# Patient Record
Sex: MALE | Race: WHITE | NOT HISPANIC OR LATINO | Employment: UNEMPLOYED | URBAN - METROPOLITAN AREA
[De-identification: names, ages, dates, MRNs, and addresses within clinical notes are randomized per-mention and may not be internally consistent; named-entity substitution may affect disease eponyms.]

---

## 2017-11-11 ENCOUNTER — COMMUNICATION - HEALTHEAST (OUTPATIENT)
Dept: SCHEDULING | Facility: CLINIC | Age: 47
End: 2017-11-11

## 2018-05-01 ENCOUNTER — OFFICE VISIT - HEALTHEAST (OUTPATIENT)
Dept: ADDICTION MEDICINE | Facility: CLINIC | Age: 48
End: 2018-05-01

## 2018-05-01 DIAGNOSIS — F15.20 METHAMPHETAMINE USE DISORDER, SEVERE (H): ICD-10-CM

## 2018-05-02 ENCOUNTER — OFFICE VISIT - HEALTHEAST (OUTPATIENT)
Dept: ADDICTION MEDICINE | Facility: CLINIC | Age: 48
End: 2018-05-02

## 2018-05-02 DIAGNOSIS — F15.20 METHAMPHETAMINE USE DISORDER, SEVERE (H): ICD-10-CM

## 2018-05-03 ENCOUNTER — OFFICE VISIT - HEALTHEAST (OUTPATIENT)
Dept: ADDICTION MEDICINE | Facility: CLINIC | Age: 48
End: 2018-05-03

## 2018-05-03 ENCOUNTER — COMMUNICATION - HEALTHEAST (OUTPATIENT)
Dept: ADDICTION MEDICINE | Facility: CLINIC | Age: 48
End: 2018-05-03

## 2018-05-03 DIAGNOSIS — F15.20 METHAMPHETAMINE USE DISORDER, SEVERE (H): ICD-10-CM

## 2018-05-08 ENCOUNTER — OFFICE VISIT - HEALTHEAST (OUTPATIENT)
Dept: ADDICTION MEDICINE | Facility: CLINIC | Age: 48
End: 2018-05-08

## 2018-05-08 DIAGNOSIS — F15.20 METHAMPHETAMINE USE DISORDER, SEVERE (H): ICD-10-CM

## 2018-05-09 ENCOUNTER — AMBULATORY - HEALTHEAST (OUTPATIENT)
Dept: ADDICTION MEDICINE | Facility: CLINIC | Age: 48
End: 2018-05-09

## 2018-05-09 ENCOUNTER — OFFICE VISIT - HEALTHEAST (OUTPATIENT)
Dept: ADDICTION MEDICINE | Facility: CLINIC | Age: 48
End: 2018-05-09

## 2018-05-09 DIAGNOSIS — F15.20 METHAMPHETAMINE USE DISORDER, SEVERE (H): ICD-10-CM

## 2018-05-10 ENCOUNTER — OFFICE VISIT - HEALTHEAST (OUTPATIENT)
Dept: ADDICTION MEDICINE | Facility: CLINIC | Age: 48
End: 2018-05-10

## 2018-05-10 DIAGNOSIS — F15.20 METHAMPHETAMINE USE DISORDER, SEVERE (H): ICD-10-CM

## 2018-05-18 ENCOUNTER — AMBULATORY - HEALTHEAST (OUTPATIENT)
Dept: ADDICTION MEDICINE | Facility: CLINIC | Age: 48
End: 2018-05-18

## 2018-05-24 ENCOUNTER — AMBULATORY - HEALTHEAST (OUTPATIENT)
Dept: ADDICTION MEDICINE | Facility: CLINIC | Age: 48
End: 2018-05-24

## 2021-05-24 ENCOUNTER — RECORDS - HEALTHEAST (OUTPATIENT)
Dept: ADMINISTRATIVE | Facility: CLINIC | Age: 51
End: 2021-05-24

## 2021-06-17 NOTE — PROGRESS NOTES
Addiction Services - EOP Treatment Plan     Patient  Name: Padilla Tijerina  MRN: [id number]   : 1970  Admit Date: 2018  Treatment Plan Update: 2018     Vulnerable Adult: No     Patient strengths and needs, identified at intake:   Strengths identified as hard worker, honest, dependable  Needs identified as go along with the crowd and hard time saying no.        Dimension 1: Acute Intoxication/Withdrawal Potential, Risk level: 1   Problem Statement from Comprehensive Assessment:  The patient reports his date of last use of methamphetamine to be on 18. The patient reports no withdrawal concerns and no withdrawal symptoms were observed.  Problem: Continued use  Goal: Patient to maintain abstinence throughout outpatient treatment.   Must be reached to complete treatment? Yes  Methods/Strategies (must include amount and frequency):   1. Patient to report any substance/alcohol use to counselor to determine if any changes need to be made to address withdrawal symptoms.   2. Patient to complete any requested UAs.   Target Date: 2018  Completion Date:     Dimension 2: Biomedical Conditions/Complications, Risk level: 0   Problem Statement from Comprehensive Assessment:  The patient reports he is able to manage and tolerate back pain related to work. The patient reports no primary care provider at this time. The patient does have insurance ([employer] [place]) so he does have access to medical care should he need. At this time there are no emergent biomedical concerns    Problem: Patient does not have a primary care provider   Goal: Patient will identify a primary care provider consistent w/his [employer] coverage and complete an appointment to establish care, as a means of maintaining stable health.  Must be reached to complete treatment? No  Methods/Strategies (must include amount and frequency):   1. Patient to attend all scheduled doctor s appointments.   2. Patient to report any changes to physical health to  "counselor.   3. Patient to take medications as prescribed.   Target Date: 9/1/2018  Completion Date:     Problem: Patient reports current tobacco use.   Goal: Patient to receive information about smoking cessation.   Must be reached to complete treatment? No  Methods/Strategies (must include amount and frequency):   1. Staff to provide patient with nicotine cessation information and help on how to quit use.   2. Patient to report any progress on stopping nicotine use to staff.   Target Date: 9/1/2018  Completion Date:     Dimension 3: Emotional/Behavioral/Cognitive, Risk level: 2   Problem Statement from Comprehensive Assessment:  The patient reports a past diagnoses of depression during his 2nd divorce years ago. The patient reports that he has a diagnostic assessment at [facility] in [place] on 5/10/18. The patient scored high risk on the PANSI however reported no thoughts of harming himself or others. The patient struggles with impulse control as evidence by his continued use despite significant negative consequences.  Problem: Patient reported having DA on [facility] on 5/3/18.  He has not yet begun MH services.  Goal: Patient will address untreated MH symptoms  Must be reached to complete treatment? Yes  Methods/Strategies (must include amount and frequency):   1. Patient will  follow recommendations from his recent DA for treating MH symptoms, and update his CD counselor on his progress  Target Date: 9/1/2018  Completion Date:     Problem: Patient' substance use appears related to impulsivity, as evidenced by repeated use despite having knowledge of the significant consequences he faces regarding child protection and corrections.  Goal: Patient will develop coping skills to minimize impulsive behaviors  Must be reached to complete treatment? Yes  Methods/Strategies (must include amount and frequency):   1. Patient to complete \"Handling Crises\" assignment and share results with your counselor " and/or treatment group.  Target Date: 7/1/2018  Completion Date:       Dimension 4: Readiness to Change, Risk level 1  Problem Statement from Comprehensive Assessment:  The patient verbalizes a need to initiate change. He reports that he is required to be here as it is required by probation. The patient reports that he is willing to follow recommendations made by treatment staff.  Problem: Patient is mandated to complete treatment by probation   Goal: Patient to increase motivation towards recovery by participating in outpatient programming.   Must be reached to complete treatment? Yes  Methods/Strategies (must include amount and frequency):   1. Patient to attend 4, 3-hour groups per week [facility], 6-9:00 PM and all scheduled individual counseling sessions.  2. Patient will contact staff beforehand if unable to attend or will be late: Terri([phone])  Target Date: 9/1/2018  Completion Date:     Problem: Patient appears to lack insight regarding how use has impacted all areas of life - relationships, family life, spirituality, physical health, finances, etc  Goal: Patient will complete Use History assignment  Must be reached to complete treatment? Yes  Methods/Strategies (must include amount and frequency):   1. Patient completed Use History assignment by completing Stages of Change/Use History group activity with peers, and receiving feedback from them, on 5/8/2018  Target Date: 7/1/2018  Completion Date: 5/8/2018    Dimension 5: Relapse/Continued Use/Continued Problem Potential, Risk level: 3   Problem Statement from Comprehensive Assessment:  At this time the patient has minimal coping skills to arrest use long term. The patient has little recognition of relapse and recidivism issues as evidence by his continued use while on probation and involvement of CPS. The patient is at high vulnerability for continued use.  Problem:  Patient has continued to use despite increasingly significant consequences. He appears  "to have minimal awareness of how to respond to triggers and high-risk situations without using.  Goal: Patient will learn to identify triggers and early warning signs of use/relapse to gain awareness of patterns of use.  Must be reached to complete treatment? Yes  Methods/Strategies (must include amount and frequency):   Patient to share in daily check-in any urges and addictive thinking to better understand his pattern of use and to prevent relapse in the future.   Target Date: 9/1/2018  Completion Date:     Problem: Patient has had periods of sobriety that end with resumed use.  Goal: Patient will complete comprehensive Relapse Prevention Plan prior to program completion.  Must be reached to complete treatment? Yes  Methods/Strategies (must include amount and frequency): Patient to complete relapse prevention assignments during treatment, then complete \"Relapse Prevention Planning\" assignment and present to his/her group and/or counselor at time of EOP program completion.  Target Date: 9/1/2018  Completion Date:     Dimension 6: Recovery Environment, Risk level: 3  Problem Statement from Comprehensive Assessment:  The patient is currently on [facility] probation under [place] for a 1st degree possession. The patient had recent CPS involvement as his two children were taken away on 3/30/18. 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 The patient has peer support and support from his fiancé. The patient is not attending any sober support meetings at this time however is interested in attending as he has gone in the past.  Problem: Patient's children are placed in out-of-home care. A TPR trial is scheduled for the patient and his SO on 5/14/18,  Goal: Adhere to CPS mandates  Must be reached to complete treatment? No  Methods/Strategies (must include amount and frequency): Follow parenting plan and all CPS recommendations, attend all scheduled appointments, complete all required " UA's.   Target Date: 9/1/2018  Completion Date:     Problem: Patient is on probation in both [place] and [facility].  Goal: Comply with probation requirements  Must be reached to complete treatment? N/A  Methods/Strategies (must include amount and frequency): Remain in contact with probation officers, attend all scheduled appointments, complete all required UA's.   Target Date: 9/1/2018  Completion Date:     Problem: Patient reports no current participation in meetings or other sober support options.  Goal: Patient will increase sober support options  Must be reached to complete treatment? Yes  Methods/Strategies (must include amount and frequency): Patient to investigate different forms of sober support, e.g. AA, NA CMA, WFS, HR, online support, participate in some form or support 1-2x weekly, and report reactions to counselor and/or treatment group.  Target Date: 7/1/2018  Completion Date:     Goal: Patient will increase participation in sober, enjoyable leisure activities  Must be reached to complete treatment? Yes  Methods/Strategies (must include amount and frequency): Patient to identify 3 new volunteer, recreational or spiritual activities to try, do that, then report to counselor and/or treatment group your reactions and your future plans to participate in these or other enjoyable activities.  Target Date: 9/1/2018  Completion Date:       Resources  Resources to which the patient is being referred for problems when problems are to be addressed concurrently by another provider: [facility], [facility] [facility] probation officers, [facility] (CPS)      Vulnerable Adult Review   [X] Review of the facility Abuse Prevention plan was reviewed with the patient   [X] No individual abuse plan is necessary   [ ] In addition to the facility Abuse Prevention plan, an Individual Abuse Plan will be put in place       Staff Name/Title: DOROTHY Leyva Date: 5/9/2018  Time:  11:59 AM    By signing this document, I am acknowledging that I was actively and directly involved in the development of my treatment plan.       Patient  Signature_________________________________________             Date__________________

## 2021-06-17 NOTE — PROGRESS NOTES
Weekly Progress Note  Padilla Tijerina  1970  [id number]      D) Pt attended 3 groups since his intake on 5/1 with 1 No Call No Show absence on 5/8. Patient attended 0 individual sessions this week. A) Staff facilitated groups and reviewed tx progress. Assessed for [facility]. R) No VAP needed at this time.   Any significant events, defines as events that impact patients relationship with others inside and outside of treatment: Yes: Patient has a TPR trial scheduled that will start 5/14/18.  Indicate any changes or monitoring of physical or mental health problems: No    Indicate involvement by any outside supports: Yes: Ongoing involvement of [facility] ([facility]) and [facility] probation officers.  IAPP reviewed and modified as needed: NA  Pt working on the following dimensions:    Dimension 1: Acute Intoxication/Withdrawal Potential, Risk level: 1   Problem Statement from Comprehensive Assessment:  The patient reports his date of last use of methamphetamine to be on 4/29/18. The patient reports no withdrawal concerns and no withdrawal symptoms were observed.  Problem: Continued use  Goal: Patient to maintain abstinence throughout outpatient treatment.   Must be reached to complete treatment? Yes  Methods/Strategies (must include amount and frequency):   1. Patient to report any substance/alcohol use to counselor to determine if any changes need to be made to address withdrawal symptoms.   2. Patient to complete any requested UAs.   Target Date: 9/1/2018  Completion Date:   Specific goals from treatment plan addressed this week:  1. Patient to maintain abstinence throughout outpatient treatment.   Effectiveness of strategies:  Patient continues to reports last date of use of meth was 4/29      Dimension 2: Biomedical Conditions/Complications, Risk level: 0   Problem Statement from Comprehensive Assessment:  The patient reports he is able to manage and tolerate back pain related to work. The  patient reports no primary care provider at this time. The patient does have insurance ([employer] [place]) so he does have access to medical care should he need. At this time there are no emergent biomedical concerns  Problem: Patient does not have a primary care provider   Goal: Patient will identify a primary care provider consistent w/his Blue Plus coverage and complete an appointment to establish care, as a means of maintaining stable health.  Must be reached to complete treatment? No  Methods/Strategies (must include amount and frequency):   1. Patient to attend all scheduled doctor s appointments.   2. Patient to report any changes to physical health to counselor.   3. Patient to take medications as prescribed.   Target Date: 9/1/2018  Completion Date:      Problem: Patient reports current tobacco use.   Goal: Patient to receive information about smoking cessation.   Must be reached to complete treatment? No  Methods/Strategies (must include amount and frequency):   1. Staff to provide patient with nicotine cessation information and help on how to quit use.   2. Patient to report any progress on stopping nicotine use to staff.   Target Date: 9/1/2018  Completion Date:   Specific goals from treatment plan addressed this week:  1. Patient will identify a primary care provider consistent w/his [employer] Plus coverage and complete an appointment to establish care, as a means of maintaining stable health.  2. Patient to receive information about smoking cessation.  Effectiveness of strategies:  Treatment Plan updated today, 5/9.      Dimension 3: Emotional/Behavioral/Cognitive, Risk level: 2   Problem Statement from Comprehensive Assessment:  The patient reports a past diagnoses of depression during his 2nd divorce years ago. The patient reports that he has a diagnostic assessment at [facility] in [place] on 5/10/18. The patient scored high risk on the PANSI however reported no thoughts of harming himself or others.  "The patient struggles with impulse control as evidence by his continued use despite significant negative consequences.  Problem: Patient reported having DA on [facility] on 5/3/18.  He has not yet begun MH services.  Goal: Patient will address untreated MH symptoms  Must be reached to complete treatment? Yes  Methods/Strategies (must include amount and frequency):   1. Patient will  follow recommendations from his recent DA for treating MH symptoms, and update his CD counselor on his progress  Target Date: 9/1/2018  Completion Date:      Problem: Patient' substance use appears related to impulsivity, as evidenced by repeated use despite having knowledge of the significant consequences he faces regarding child protection and corrections.  Goal: Patient will develop coping skills to minimize impulsive behaviors  Must be reached to complete treatment? Yes  Methods/Strategies (must include amount and frequency):   1. Patient to complete \"Handling Crises\" assignment and share results with your counselor and/or treatment group.  Target Date: 7/1/2018  Completion Date:   Specific goals from treatment plan addressed this week:  1. Patient will address untreated MH symptoms  2. Patient will develop coping skills to minimize impulsive behaviors  Effectiveness of strategies:  Treatment Plan updated today, 5/9/18.      Dimension 4: Readiness to Change, Risk level 1  Problem Statement from Comprehensive Assessment:  The patient verbalizes a need to initiate change. He reports that he is required to be here as it is required by probation. The patient reports that he is willing to follow recommendations made by treatment staff.  Problem: Patient is mandated to complete treatment by probation   Goal: Patient to increase motivation towards recovery by participating in outpatient programming.   Must be reached to complete treatment? Yes  Methods/Strategies (must include amount and frequency):   1. Patient to attend 4, 3-hour groups " per week [facility], 6-9:00 PM and all scheduled individual counseling sessions.  2. Patient will contact staff beforehand if unable to attend or will be late: Terri([phone])  Target Date: 9/1/2018  Completion Date:      Problem: Patient appears to lack insight regarding how use has impacted all areas of life - relationships, family life, spirituality, physical health, finances, etc  Goal: Patient will complete Use History assignment  Must be reached to complete treatment? Yes  Methods/Strategies (must include amount and frequency):   1. Patient completed Use History assignment by completing Stages of Change/Use History group activity with peers, and receiving feedback from them, on 5/8/2018  Target Date: 7/1/2018  Completion Date: 5/8/2018  Specific goals from treatment plan addressed this week:  1. Patient to increase motivation towards recovery by participating in outpatient programming.   2. Patient will complete Use History assignment  Effectiveness of strategies:  Patient had 1 No Call No Show absence on 5/7.  On 5/8, he stated that he was having car issues and did not have contact info to call and reports absence.  (He was given this at intake, also on initial treatment plan.  He was provided with counselor's card on 5/8.)  Completed Use History assignment on 5/8.      Dimension 5: Relapse/Continued Use/Continued Problem Potential, Risk level: 3  Problem Statement from Comprehensive Assessment:  At this time the patient has minimal coping skills to arrest use long term. The patient has little recognition of relapse and recidivism issues as evidence by his continued use while on probation and involvement of CPS. The patient is at high vulnerability for continued use.  Problem:  Patient has continued to use despite increasingly significant consequences. He appears to have minimal awareness of how to respond to triggers and high-risk situations without using.  Goal: Patient will learn to identify triggers and  "early warning signs of use/relapse to gain awareness of patterns of use.  Must be reached to complete treatment? Yes  Methods/Strategies (must include amount and frequency):   Patient to share in daily check-in any urges and addictive thinking to better understand his pattern of use and to prevent relapse in the future.   Target Date: 9/1/2018  Completion Date:      Problem: Patient has had periods of sobriety that end with resumed use.  Goal: Patient will complete comprehensive Relapse Prevention Plan prior to program completion.  Must be reached to complete treatment? Yes  Methods/Strategies (must include amount and frequency): Patient to complete relapse prevention assignments during treatment, then complete \"Relapse Prevention Planning\" assignment and present to his/her group and/or counselor at time of EOP program completion.  Target Date: 9/1/2018  Completion Date:   Specific goals from treatment plan addressed this week:  1. Patient will learn to identify triggers and early warning signs of use/relapse to gain awareness of patterns of use.  2. Patient will complete comprehensive Relapse Prevention Plan prior to program completion  Effectiveness of strategies:  Treatment Plan updated today, 5/8      Dimension 6: Recovery Environment, Risk level: 3  Problem Statement from Comprehensive Assessment:  The patient is currently on [facility] probation under Moriah Strickland for a 1st degree possession. The patient had recent CPS involvement as his two children were taken away on 3/30/18. 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 The patient has peer support and support from his fiancé. The patient is not attending any sober support meetings at this time however is interested in attending as he has gone in the past.  Problem: Patient's children are placed in out-of-home care. A TPR trial is scheduled for the patient and his SO on 5/14/18,  Goal: Adhere to CPS mandates  Must be reached to " complete treatment? No  Methods/Strategies (must include amount and frequency): Follow parenting plan and all CPS recommendations, attend all scheduled appointments, complete all required UA's.   Target Date: 9/1/2018  Completion Date:      Problem: Patient is on probation in both [facility].  Goal: Comply with probation requirements  Must be reached to complete treatment? N/A  Methods/Strategies (must include amount and frequency): Remain in contact with probation officers, attend all scheduled appointments, complete all required UA's.   Target Date: 9/1/2018  Completion Date:      Problem: Patient reports no current participation in meetings or other sober support options.  Goal: Patient will increase sober support options  Must be reached to complete treatment? Yes  Methods/Strategies (must include amount and frequency): Patient to investigate different forms of sober support, e.g. AA, NA CMA, WFS, HR, online support, participate in some form or support 1-2x weekly, and report reactions to counselor and/or treatment group.  Target Date: 7/1/2018  Completion Date:      Goal: Patient will increase participation in sober, enjoyable leisure activities  Must be reached to complete treatment? Yes  Methods/Strategies (must include amount and frequency): Patient to identify 3 new volunteer, recreational or spiritual activities to try, do that, then report to counselor and/or treatment group your reactions and your future plans to participate in these or other enjoyable activities.  Target Date: 9/1/2018  Completion Date:      Specific goals from treatment plan addressed this week:  1. Adhere to CPS mandates  2. Comply with probation requirements  3. Patient will increase sober support options.  4. Patient will increase participation in sober, enjoyable leisure activities  Effectiveness of strategies:  Patient reports having visits w/his 2 sons this past weekend. TPR trial scheduled to start 5/14.   Patient's [facility] CPS worker explained possible outcomes, which could be different for the patient's 18 month old son compared to his 12 year old son.  Group activities this week included how to access info about meetings on the [facility] website.  Patient reports that meeting attendance was important to him during previous period of sobriety. Also, that he is thinking about asking his [employer] PO if he could return to [employer] Court. A sober activity he might like to join is a softball team.    T) Treatment plan updated: yes.  Patient notified and in agreement: Yes.  Patient educated on Mental Health and Sober Structures. Patient has completed 9 of 84 program hours at this time. Projected discharge date is 9/1/2018. Current discharge/transition plan is Recovery Maintenance programming.     DOROTHY Leyva  5/9/2018, 3:11 PM        Psycho-Educational Curriculum  Date Attended  Psycho-Educational Curriculum  Date Attended    Acceptance   Shame/Guilt     1st Step   Anger/Rage     Affirmations   Mental Health  5/5, 5/3   Automatic Negative Thoughts   Anxiety     Cross Addiction   Co-Occurring Disorders  5/2   Stages of Change   Antonette/Bipolar     Relapse   Trauma /PTSD 5/2     Borderline PD 5/2     Medications 5/3   Addictive Thoughts   Victim Identity     Coping Skills   Sober Structure  5/8   Relapse Prevention   Continuum of Care     Medical Aspects   Non-12 Step Support     Brain/Neurotransmitters   Priorities     Medication Compliance   Spirituality  5/3     Yoga 5/3   MARIO Alcohol/Drug Research   Weekend Planner  5/3   Physical Health   Educational Videos     HIV/AIDS education      Post Acute Withdrawal   1st Step     Pregnancy and Drug Use   2nd Step     Sexual Health   Assertive Communication     Short-Term/Long-Term Effects   My name is Chip ACKERMANAndrews Rowland   Cross Addiction     Assertive Communication   God As We Understood Him     Boundaries   HBO Relapse     Codependence    HBO What Is  "Addiction     Defense Mechanisms    Medical Aspects 1     Family Roles   Medical Aspects 2     Goodbye Letter   [employer]ographic: Stress     Intimacy   PBS Depression Out of the Shadows     Needs/Dealbreakers in Relationships   The Anonymous People    Socialization Skills   C[place]sey     Feelings   Andrew House \"Highjacked Brain\"    ABC Model of Emotion   Emery Chase Humor in [place]    Grief and Loss   The Mindfulness Movie    Healthy vs. Unhealthy Feelings   Chip RICHMOND documentary     Meditation/Mindfulness  Every group session     Overconfidence/Complacency       Resentments       Stress           "

## 2021-06-17 NOTE — PROGRESS NOTES
Addiction Services - Initial Services Plan/Treatment Plan     Patient  Name: Padilla Tijerina  MRN: [id number]   : 1970  Admit Date: 18       Patient describes their immediate need: To learn recovery skills to prevent relapse. Begin group and get serious about recovery. I need a structured program.     Are there any immediate Safety Needs such as (physical, stability, mobility):  Pt is able to get medical care as needed. Pt denies immediate concerns.     Immediate Health Needs and Plan:   Remain abstinent from substance use and attend first group therapy session on 18    Vulnerable Adult: No     Issues to be addressed in the first sessions:   Treatment planning, orientation to group norms and rules, and welcomed by peers.     Patient strengths and needs:   Strengths identified as hard worker, honest, dependable  Needs identified as go along with the crowd and hard time saying no.     Plan for patient for time between intake and completion of the treatment plan:   Attend all group therapy sessions as directed, complete all written and oral assignments as directed, and remain clean and sober. A relapse, if any, must be reported to staff immediately in order to ensure you are receiving the proper level of care. If you cannot attend a group therapy session you must call contact information provided in intake folder and leave a message before or during group hours.     Dimension 1: Acute Intoxication/Withdrawal Potential, Risk level: 1    Problem Statement from Comprehensive Assessment:  Dimension I Risk Ratin: The patient reports his date of last use of methamphetamine to be on 18. The patient reports no withdrawal concerns and no withdrawal symptoms were observed.  Problem: Continued use   Goal: Patient to maintain abstinence throughout outpatient treatment.   Must be reached to complete treatment? Yes  Methods/Strategies (must include amount and frequency):   1. Patient to report any  substance/alcohol use to counselor to determine if any changes need to be made to address withdrawal symptoms.   2. Patient to complete any requested UAs.   Target Date: 18  Completion Date:     Dimension 2: Biomedical Conditions/Complications, Risk level: 0    Problem Statement from Comprehensive Assessment:  Dimension II Risk Ratin: The patient reports he is able to manage and tolerate back pain related to work. The patient reports no primary care provider at this time. The patient does have insurance ([employer] [place]) so he does have access to medical care should he need. At this time there are no emergent biomedical concerns  Problem: Patient does not have a primary care provider   Goal: Patient to maintain stable health throughout outpatient treatment.   Must be reached to complete treatment? No  Methods/Strategies (must include amount and frequency):   1. Patient to report any changes to physical health to counselor.   2. Patient to attend all scheduled doctor s appointments.   3. Patient to take medications as prescribed.   Target Date: 18  Completion Date:     Problem: Patient reports current tobacco use. Goal: Patient to receive information about smoking cessation.   Must be reached to complete treatment? No  Methods/Strategies (must include amount and frequency):   1. Staff to provide patient with nicotine cessation information and help on how to quit use.   2. Patient to report any progress on stopping nicotine use to staff.   Target Date: 18  Completion Date:     Dimension 3: Emotional/Behavioral/Cognitive, Risk level: 2    Problem Statement from Comprehensive Assessment:  Dimension III Risk Ratin: The patient reports a past diagnoses of depression during his 2nd divorce years ago. The patient reports that he has a diagnostic assessment at [facility] in [place] on 5/10/18. The patient scored high risk on the PANSI however reported no thoughts of harming himself or others. The  patient struggles with impulse control as evidence by his continued use despite significant negative consequences.  Problem: Patient does not have mental health services.   Goal: Manage Mental Health   Must be reached to complete treatment? Yes  Methods/Strategies (must include amount and frequency):   1. Patient to begin using coping skills learned in therapeutic group (such as grounding, breathing, thought challenging, mindfulness, etc), and share in daily check-in any benefits or challenges that you experience using these skills.  2: Attend DA at [facility]   Target Date: 5/29/18  Completion Date:       Dimension 4: Readiness to Change, Risk level 1    Problem Statement from Comprehensive Assessment:  The patient verbalizes a need to initiate change. He reports that he is required to be here as it is required by probation. The patient reports that he is willing to follow recommendations made by treatment staff.  Problem: Patient is mandated to complete treatment by probation   Goal: Patient to increase motivation towards recovery by participating in outpatient programming.   Must be reached to complete treatment? Yes  Methods/Strategies (must include amount and frequency):   1. Patient to attend 4, 3-hour groups per week and all scheduled 1:1s.  2. Patient will contact staff if unable to attend [phone] (Terri Camarena)  Target Date: 5/29/18  Completion Date:     Dimension 5: Relapse/Continued Use/Continued Problem Potential, Risk level: 3    Problem Statement from Comprehensive Assessment:  At this time the patient has minimal coping skills to arrest use long term. The patient has little recognition of relapse and recidivism issues as evidence by his continued use while on probation and involvement of CPS. The patient is at high vulnerability for continued use.  Problem: Patient has minimal coping skills   Goal: Develop coping skills   Must be reached to complete treatment? Yes  Methods/Strategies  (must include amount and frequency):   Patient to share in daily check-in any urges and addictive thinking to better understand his pattern of use and to prevent relapse in the future.   Target Date: 5/29/18  Completion Date:     Dimension 6: Recovery Environment, Risk level: 3    Problem Statement from Comprehensive Assessment:  The patient is currently on [facility] probation under [place] Strickland for a 1st degree possession. The patient had recent CPS involvement as his two children were taken away on 3/30/18. 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 The patient has peer support and support from his fiancé. The patient is not attending any sober support meetings at this time however is interested in attending as he has gone in the past.  Problem: Patient is on probation for a 1st degree possession  Goal: Comply with probation requirements  Must be reached to complete treatment? N/A  Methods/Strategies (must include amount and frequency): Remain in contact with , attend all scheduled appointments and UA's.   Target Date: 5/29/18  Completion Date:     Resources  Resources to which the patient is being referred for problems when problems are to be addressed concurrently by another provider: NA      Vulnerable Adult Review   [X] Review of the facility Abuse Prevention plan was reviewed with the patient   [X] No individual abuse plan is necessary   [ ] In addition to the facility Abuse Prevention plan, an Individual Abuse Plan will be put in place       Staff Name/Title: DOROTHY Farmer Date: 5/1/2018  Time: 9:22 AM    By signing this document, I am acknowledging that I was actively and directly involved in the development of my treatment plan.       Patient  Signature_________________________________________             Date__________________

## 2021-06-17 NOTE — PROGRESS NOTES
"Intake Note:     D) Padilla Tijerina is a 47 y.o.  engaged White or  male who is referred to [facility] Evening Outpatient via Probation with funding from [employer]. Patient orientated x 3. Patient meets criteria for Stimulant Use Disorder Amphetamine Type, Severe.  Patient appears appropriate for Evening Outpatient.   A) Met with patient for 50 minutes.  Completed intake assessment and preliminary paperwork. Patient was given and explained counselor & supervisor license number and contact info, Patient Bill of Rights, program rules/regulations, Program Abuse Prevention Plan, confidentiality & HIPPA policies, grievance procedure, presented ROIs, TB & HIV/AIDS policies & resources, and Vulnerable Adult policy.   Conducted Vulnerable Adult Assessment.   R)No special Vulnerable Adult needed at this time.  Patient signed and agreed to counselor & supervisor license number and contact info., Patient Bill of Rights, group rules/regulations, Program Abuse Prevention Plan, confidentiality & HIPPA policies, grievance procedure,  ROIs, TB & HIV/AIDS policies & resources, and Vulnerable Adult policy. Patient scored high risk on PANSI screen. Patient denied suicidal ideation/intent/plan/means at this time.     Opioid Use Disorder: No   Provided \"Options for Opioid Treatment in [place] and Overdose Prevention\" No     Dimension I Risk Ratin: The patient reports his date of last use of methamphetamine to be on 18. The patient reports no withdrawal concerns and no withdrawal symptoms were observed.  Dimension II Risk Ratin: The patient reports he is able to manage and tolerate back pain related to work. The patient reports no primary care provider at this time. The patient does have insurance ([employer] [place]) so he does have access to medical care should he need. At this time there are no emergent biomedical concerns.  Dimension III Risk Ratin: The patient reports a past diagnoses of depression during his 2nd " divorce years ago. The patient reports that he has a diagnostic assessment at [facility] in [place] on 5/10/18. The patient scored high risk on the PANSI however reported no thoughts of harming himself or others. The patient struggles with impulse control as evidence by his continued use despite significant negative consequences.  Dimension IV Risk Ratin: The patient verbalizes a need to initiate change. He reports that he is required to be here as it is required by probation. The patient reports that he is willing to follow recommendations made by treatment staff.  Dimension V Risk Rating: 3: At this time the patient has minimal coping skills to arrest use long term. The patient has little recognition of relapse and recidivism issues as evidence by his continued use while on probation and involvement of CPS. The patient is at high vulnerability for continued use.  Dimension VI Risk Rating: 3: The patient is currently on [facility] probation under Moriah Strickland for a 1st degree possession. The patient had recent CPS involvement as his two children were taken away on 3/30/18. 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 The patient has peer support and support from his fiancé. The patient is not attending any sober support meetings at this time however is interested in attending as he has gone in the past.  T) Explained counselor & supervisor license number and contact info, Patient Bill of Rights, program rules/regulations, Program Abuse Prevention Plan, confidentiality & HIPPA policies, grievance procedure, presented ROIs, TB & HIV/AIDS policies & resources, and Vulnerable Adult policy. Patient expected to start group on 18.      DOROTHY Farmer  2018, 2:53 PM

## 2021-06-18 NOTE — PROGRESS NOTES
Weekly Progress Note  Padilla Tijerina  1970  [id number]      D) Pt attended 2 groups since his last progress review with 1 excused absence on 5/14, and 3 No Call No Show absences on 5/15, 5/16 and 5/17. Patient attended 0 individual sessions this week. A) Staff facilitated groups and reviewed tx progress. Assessed for [facility]. R) No VAP needed at this time.   Any significant events, defines as events that impact patients relationship with others inside and outside of treatment: Yes: Patient's TPR trial on 5/14/18 was resolved with him voluntarily relinquishing parental rights.  Indicate any changes or monitoring of physical or mental health problems: No    Indicate involvement by any outside supports: Yes: Ongoing involvement of [facility] (C[facility]) and [facility] probation officers.  IAPP reviewed and modified as needed: NA  Pt working on the following dimensions:    Dimension 1: Acute Intoxication/Withdrawal Potential, Risk level: 1   Problem Statement from Comprehensive Assessment:  The patient reports his date of last use of methamphetamine to be on 4/29/18. The patient reports no withdrawal concerns and no withdrawal symptoms were observed.  Problem: Continued use  Goal: Patient to maintain abstinence throughout outpatient treatment.   Must be reached to complete treatment? Yes  Methods/Strategies (must include amount and frequency):   1. Patient to report any substance/alcohol use to counselor to determine if any changes need to be made to address withdrawal symptoms.   2. Patient to complete any requested UAs.   Target Date: 9/1/2018  Completion Date:   Specific goals from treatment plan addressed this week:  1. Patient to maintain abstinence throughout outpatient treatment.   Effectiveness of strategies:  Unable to access due to patient absences 5/14-5/17.       Dimension 2: Biomedical Conditions/Complications, Risk level: 0   Problem Statement from Comprehensive Assessment:  The  patient reports he is able to manage and tolerate back pain related to work. The patient reports no primary care provider at this time. The patient does have insurance ([employer] [place]) so he does have access to medical care should he need. At this time there are no emergent biomedical concerns  Problem: Patient does not have a primary care provider   Goal: Patient will identify a primary care provider consistent w/his Blue Plus coverage and complete an appointment to establish care, as a means of maintaining stable health.  Must be reached to complete treatment? No  Methods/Strategies (must include amount and frequency):   1. Patient to attend all scheduled doctor s appointments.   2. Patient to report any changes to physical health to counselor.   3. Patient to take medications as prescribed.   Target Date: 9/1/2018  Completion Date:      Problem: Patient reports current tobacco use.   Goal: Patient to receive information about smoking cessation.   Must be reached to complete treatment? No  Methods/Strategies (must include amount and frequency):   1. Staff to provide patient with nicotine cessation information and help on how to quit use.   2. Patient to report any progress on stopping nicotine use to staff.   Target Date: 9/1/2018  Completion Date:   Specific goals from treatment plan addressed this week:  1. Patient will identify a primary care provider consistent w/his Blue Plus coverage and complete an appointment to establish care, as a means of maintaining stable health.  2. Patient to receive information about smoking cessation.  Effectiveness of strategies:  Unable to assess due to patient absences on 5/14-5/17.      Dimension 3: Emotional/Behavioral/Cognitive, Risk level: 2   Problem Statement from Comprehensive Assessment:  The patient reports a past diagnoses of depression during his 2nd divorce years ago. The patient reports that he has a diagnostic assessment at [facility] in [place] on 5/10/18.  "The patient scored high risk on the PANSI however reported no thoughts of harming himself or others. The patient struggles with impulse control as evidence by his continued use despite significant negative consequences.  Problem: Patient reported having DA on [facility] [employer] on 5/3/18.  He has not yet begun MH services.  Goal: Patient will address untreated MH symptoms  Must be reached to complete treatment? Yes  Methods/Strategies (must include amount and frequency):   1. Patient will  follow recommendations from his recent DA for treating MH symptoms, and update his CD counselor on his progress  Target Date: 9/1/2018  Completion Date:      Problem: Patient' substance use appears related to impulsivity, as evidenced by repeated use despite having knowledge of the significant consequences he faces regarding child protection and corrections.  Goal: Patient will develop coping skills to minimize impulsive behaviors  Must be reached to complete treatment? Yes  Methods/Strategies (must include amount and frequency):   1. Patient to complete \"Handling Crises\" assignment and share results with your counselor and/or treatment group.  Target Date: 7/1/2018  Completion Date:   Specific goals from treatment plan addressed this week:  1. Patient will address untreated MH symptoms  2. Patient will develop coping skills to minimize impulsive behaviors  Effectiveness of strategies:  Unable to assess due to patient absences on 5/14-5/17.    Dimension 4: Readiness to Change, Risk level 1  Problem Statement from Comprehensive Assessment:  The patient verbalizes a need to initiate change. He reports that he is required to be here as it is required by probation. The patient reports that he is willing to follow recommendations made by treatment staff.  Problem: Patient is mandated to complete treatment by probation   Goal: Patient to increase motivation towards recovery by participating in outpatient programming.   Must be reached to " complete treatment? Yes  Methods/Strategies (must include amount and frequency):   1. Patient to attend 4, 3-hour groups per week [facility], 6-9:00 PM and all scheduled individual counseling sessions.  2. Patient will contact staff beforehand if unable to attend or will be late: Terri([phone])  Target Date: 9/1/2018  Completion Date:      Problem: Patient appears to lack insight regarding how use has impacted all areas of life - relationships, family life, spirituality, physical health, finances, etc  Goal: Patient will complete Use History assignment  Must be reached to complete treatment? Yes  Methods/Strategies (must include amount and frequency):   1. Patient completed Use History assignment by completing Stages of Change/Use History group activity with peers, and receiving feedback from them, on 5/8/2018  Target Date: 7/1/2018  Completion Date: 5/8/2018  Specific goals from treatment plan addressed this week:  1. Patient to increase motivation towards recovery by participating in outpatient programming.   2. Patient will complete Use History assignment  Effectiveness of strategies:   Completed Use History assignment on 5/8. Patient called on 5/14 to report outcome of TPR trial and that he would not be attending group that night. Since then, patient had no show / No Call absences on 5/15/ 5/16 and 5/17. An attempt will be made to contact patient.      Dimension 5: Relapse/Continued Use/Continued Problem Potential, Risk level: 3  Problem Statement from Comprehensive Assessment:  At this time the patient has minimal coping skills to arrest use long term. The patient has little recognition of relapse and recidivism issues as evidence by his continued use while on probation and involvement of CPS. The patient is at high vulnerability for continued use.  Problem:  Patient has continued to use despite increasingly significant consequences. He appears to have minimal awareness of how to respond to triggers and  "high-risk situations without using.  Goal: Patient will learn to identify triggers and early warning signs of use/relapse to gain awareness of patterns of use.  Must be reached to complete treatment? Yes  Methods/Strategies (must include amount and frequency):   Patient to share in daily check-in any urges and addictive thinking to better understand his pattern of use and to prevent relapse in the future.   Target Date: 9/1/2018  Completion Date:      Problem: Patient has had periods of sobriety that end with resumed use.  Goal: Patient will complete comprehensive Relapse Prevention Plan prior to program completion.  Must be reached to complete treatment? Yes  Methods/Strategies (must include amount and frequency): Patient to complete relapse prevention assignments during treatment, then complete \"Relapse Prevention Planning\" assignment and present to his/her group and/or counselor at time of EOP program completion.  Target Date: 9/1/2018  Completion Date:   Specific goals from treatment plan addressed this week:  1. Patient will learn to identify triggers and early warning signs of use/relapse to gain awareness of patterns of use.  2. Patient will complete comprehensive Relapse Prevention Plan prior to program completion  Effectiveness of strategies:   Unable to assess due to patient absences on 5/14-5/17.      Dimension 6: Recovery Environment, Risk level: 3  Problem Statement from Comprehensive Assessment:  The patient is currently on [facility] probation under Moirah Strickland for a 1st degree possession. The patient had recent CPS involvement as his two children were taken away on 3/30/18. 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 The patient has peer support and support from his fiancé. The patient is not attending any sober support meetings at this time however is interested in attending as he has gone in the past.  Problem: Patient's children are placed in out-of-home care. A TPR " trial is scheduled for the patient and his SO on 5/14/18,  Goal: Adhere to CPS mandates  Must be reached to complete treatment? No  Methods/Strategies (must include amount and frequency): Follow parenting plan and all CPS recommendations, attend all scheduled appointments, complete all required UA's.   Target Date: 9/1/2018  Completion Date:      Problem: Patient is on probation in both [place] and [facility].  Goal: Comply with probation requirements  Must be reached to complete treatment? N/A  Methods/Strategies (must include amount and frequency): Remain in contact with probation officers, attend all scheduled appointments, complete all required UA's.   Target Date: 9/1/2018  Completion Date:      Problem: Patient reports no current participation in meetings or other sober support options.  Goal: Patient will increase sober support options  Must be reached to complete treatment? Yes  Methods/Strategies (must include amount and frequency): Patient to investigate different forms of sober support, e.g. AA, NA CMA, WFS, HR, online support, participate in some form or support 1-2x weekly, and report reactions to counselor and/or treatment group.  Target Date: 7/1/2018  Completion Date:      Goal: Patient will increase participation in sober, enjoyable leisure activities  Must be reached to complete treatment? Yes  Methods/Strategies (must include amount and frequency): Patient to identify 3 new volunteer, recreational or spiritual activities to try, do that, then report to counselor and/or treatment group your reactions and your future plans to participate in these or other enjoyable activities.  Target Date: 9/1/2018  Completion Date:      Specific goals from treatment plan addressed this week:  1. Adhere to CPS mandates  2. Comply with probation requirements  3. Patient will increase sober support options.  4. Patient will increase participation in sober, enjoyable leisure activities  Effectiveness of  "strategies:   This week, patient has not complied with probation requirements to complete treatment. Unable to assess other goals due to patient absences on 5/14-5/17.  T) Treatment plan updated: No.  Patient notified and in agreement: N/A.  Patient educated on Sober Structures. Patient has completed 15 of 84 program hours at this time. Projected discharge date is 9/1/2018. Current discharge/transition plan is Recovery Maintenance programming.     Ammy Stahl  5/18/2018, 11:07 AM        Psycho-Educational Curriculum  Date Attended  Psycho-Educational Curriculum  Date Attended    Acceptance   Shame/Guilt     1st Step   Anger/Rage     Affirmations   Mental Health  5/5, 5/3   Automatic Negative Thoughts   Anxiety     Cross Addiction   Co-Occurring Disorders  5/2   Stages of Change   Antonette/Bipolar     Relapse   Trauma /PTSD 5/2     Borderline PD 5/2     Medications 5/3   Addictive Thoughts   Victim Identity     Coping Skills   Sober Structure  5/8, 5/9, 5/10   Relapse Prevention   Continuum of Care     Medical Aspects   Non-12 Step Support     Brain/Neurotransmitters   Priorities  5/10   Medication Compliance   Spirituality  5/3, 5/10   Nurse Talk 5/9 Yoga 5/3   MARIO Alcohol/Drug Research   Weekend Planner  5/3, 5/10   Physical Health   Educational Videos     HIV/AIDS education      Post Acute Withdrawal   1st Step     Pregnancy and Drug Use   2nd Step     Sexual Health   Assertive Communication     Short-Term/Long-Term Effects   My name is Chip Rowland   Cross Addiction     Assertive Communication   God As We Understood Him     Boundaries   HBO Relapse     Codependence    HBO What Is Addiction     Defense Mechanisms    Medical Aspects 1     Family Roles   Medical Aspects 2     Goodbye Letter   [employer]: Stress     Intimacy   PBS Depression Out of the Shadows     Needs/Dealbreakers in Relationships   The Anonymous People    Socialization Skills   Marcus     Feelings   Andrew House \"Highjacked " "Brain\"    ABC Model of Emotion   Emery Chase Humor in [place]    Grief and Loss   The Mindfulness Movie    Healthy vs. Unhealthy Feelings   Chip [place] documentary     Meditation/Mindfulness  Every group session     Overconfidence/Complacency       Resentments       Stress           "

## 2021-06-18 NOTE — PROGRESS NOTES
[facility] SUBSTANCE USE DISORDER  DISCHARGE SUMMARY            NAME:  Padilla Tijerina   Physician:  No PCP provided   MRN:  [id number]   :  DOROTHY Leyva   Admit Date: 2018   Discharge Date: 2018   :  1970   Hours Completed: 15   Initial Diagnosis:  Stimulant (Methamphetamine) Use Disorder-Severe (F15.20)   Final Diagnosis:  Stimulant (Methamphetamine) Use Disorder-Severe (F15.20)   Discharge Address:    [address] [place] [address]   Funding Source:    [employer] [place]     Discharge Type:  Absent Without Leave (AWOL)    Client was receiving residential services at the time of discharge:   No      Reasons for and circumstances of service termination:  Patient had completed 15 hours of group IOP/EOP group treatment.  He called to report his absence on  due to being in court all day and having to work at night.  Since then, he has had No Call No Show absences on 5/15, , , , , .       If program discharge status was At Staff Request, the license rincon must identify the following:    Other interested parties conferred with: Patient's Jah Mcallister PO has been notified.    Referrals provided: Jah Mcallister Probation, [facility]    Alternatives considered and attempted before deciding to discharge:  Patient has not responded to attempts to contact him.      Dimension/Course of Treatment/Individualized Care:   1.  Withdrawal Potential - Intake Risk level -  1 Discharge Risk level - 1  Narrative supporting risk description:  Patient had reported his date of last use of methamphetamine to be on 18.   Treatment plan goals and progress towards those goals:  1.  Patient to maintain abstinence throughout outpatient treatment.     Unknown if Patient has maintained abstinence.     2.  Biomedical Conditions and Complications - Intake Risk level -  0 Discharge Risk level - 0  Narrative supporting risk description:  The patient reports he is able to manage and  "tolerate back pain related to work. The patient reports no primary care provider at this time. The patient does have insurance ([employer] [place]) so he does have access to medical care should he need. At this time there are no emergent biomedical concerns  Treatment plan goals and progress towards those goals:  1. Patient will identify a primary care provider consistent w/his Blue Plus coverage and complete an appointment to establish care, as a means of maintaining stable health.  2. Patient to receive information about smoking cessation..    No progress reported on either goal     3.  Emotional/Behavioral/Cognitive Conditions and Complications - Intake Risk level -  2 Discharge Risk level - 2  Narrative supporting risk description:  The patient reports a past diagnoses of depression during his 2nd divorce years ago. The patient reports that he has a diagnostic assessment at [facility] in [place] on 5/10/18. The patient scored high risk on the PANSI however reported no thoughts of harming himself or others. The patient struggles with impulse control as evidence by his continued use despite significant negative consequences.  Treatment plan goals and progress towards those goals:  1. Patient will address untreated MH symptoms  2. Patient will develop coping skills to minimize impulsive behaviors    Patient completed appt for DA on 5/3/18. As of 5/10 he had not yet begun MH services. He did not complete individual assignment \"Handling Crises.\"     4.  Readiness for Change - Intake Risk level -  1 Discharge Risk level - 2  Narrative supporting risk description:  At intak, the patient verbalized a need to initiate change. He reports that he is required to be here as it is required by probation. The patient reported that he is willing to follow recommendations made by treatment staff. Patient continued to state that he was willing to engage in treatment and did participate actively in group processes when present - " he did not comply w/treatment expectations regarding attendance and reporting absences.  Treatment plan goals and progress towards those goals:  1. Patient to increase motivation towards recovery by participating in outpatient programming.   2. Patient will complete Use History assignment  Effectiveness of strategies:  Completed Use History assignment on 5/8 as part of a group activity.     5.  Relapse/Continued Use/Continued Problem Potential - Intake Risk level -  3 Discharge Risk level - 3  Narrative supporting risk description:  At intake, the patient appeared to have minimal coping skills to arrest use long term. The patient had little recognition of relapse and recidivism issues as evidenced by his continued use while on probation and involvement of CPS. The patient appeared to be at high vulnerability for continued use.  Treatment plan goals and progress towards those goals:  1. Patient will learn to identify triggers and early warning signs of use/relapse to gain awareness of patterns of use.  2. Patient will complete comprehensive Relapse Prevention Plan prior to program completion    No progress noted on these goals.     6.  Recovery Environment - Intake Risk level -  3 Discharge Risk level - 3  Narrative supporting risk description:  At intake, Patient [facility]. The patient had recent CPS involvement as his two children were taken away on 3/30/18. 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 The patient has peer support, and support from his fiancé. The patient is not attending any sober support meetings at this time however is interested in attending as he has gone in the past.  Treatment plan goals and progress towards those goals:  1. Adhere to CPS mandates  2. Comply with probation requirements  3. Patient will increase sober support options.  4. Patient will increase participation in sober, enjoyable leisure  activities    Patient reported on 5/14 that he was voluntarily agreeing to TPR for his sons. It appears no progress was made on remaining goals.     Strengths: At intake, strengths identified as hard worker, honest, dependable  Needs: At intake, needs identified as go along with the crowd and hard time saying no.     Services Provided: Intake, assessment, treatment planning, education, group discussions, videos, lectures, 1x1 therapy.       Program Involvement: Poor  Attendance: Poor  Ability to relate in group/   Other program activities: Good  Assignment Completion: Poor  Overall Behavior: Poor  Reported Family/Significant   Other Involvement: NA    Prognosis: Poor      Recommendations       Attend sober support meetings, return for assessment and treatment recommendations when ready to engage in CD treatment consistently.    Mental Health Referral  Individual Therapy, as recommended by recent Diagnostic Assessment.      Physical Health Referral:  NA         Counselor Name and Title:  DOROTHY Leyva        Date:  5/24/2018  Time:  12:36 PM

## 2021-07-04 NOTE — PROGRESS NOTES
"Rule 31 by Kelsea Choi LADC at 2018  9:00 AM     Author: Kelsea Choi LADC Service: -- Author Type: Licensed Alcohol and Drug Counselor    Filed: 2018  2:38 PM Encounter Date: 2018 Status: Signed    : Kelsea Choi LADC (Licensed Alcohol and Drug Counselor)         [facility] Assessment   Date: 2018        : DOROTHY Farmer    Name: Padilla Tijerina  Address: [address] [place] [address]  Phone: [phone] (home)   Referral Source: probation and CPS   : 1970  Age: 47 y.o.  Race/Ethnicity: White or   Marital Status:   Employment: Full time employment- overnights                                                                                                                       Level of Education: College- 6 credits short of a 4 year degree   Socio-economic (yearly Income) Status: $5500.00 per month  Sexual Orientation: Heterosexual   Last 4 digits of Social Security: 6121    Is assistance required in the ability to read and understand written material?   no    Reason for seeking services:    \"To stay sober\"    Dimension I Acute intoxication/Withdrawal Potential:    Symptomology (past 12 months, check all that apply)  increased tolerance, AM use, medicinal use, using alone and repeated family or social problems    Observed or reported (withdrawal symptoms, check all that apply)  none reported or displayed    Chemical use most recent 12 months outside a facility and other significant use history (client self-report)  Primary Drug Used  Age of First Use  Most Recent Pattern of Use and Duration    Date of last use  Time if substance use in the last 30 days Withdrawal Potential? Requiring special care  Method of use   (oral, smoked, snort, IV, etc)    Alcohol  12 Couple times about 2x in the last 12 months  2017 N N Oral    Marijuana/Hashish   Denies        Cocaine/Crack   Denies        Meth/Amphetamines  " 32 Quarter gram when I decide to use  18 11PM  N Smoke    Heroin   Denies       Other Opiates/Synthetics   Denies       Inhalants   Denies       Benzodiazepines   Denies       Hallucinogens   Denies       Barbiturates/Sedatives/Hypnotics   Denies       Over-the-Counter Drugs   Denies       Other   Denies       Nicotine  13 1 pack per day  18 9:15am   Smoke        Dimension I Risk Ratin  Reason Risk Rating Assigned: The patient reports his date of last use of methamphetamine to be on 18. The patient reports no withdrawal concerns and no withdrawal symptoms were observed.     Dimension II Biomedical Conditions:    Any known health conditions: Yes    Ever previously treated/diagnosed with any eating disorder?  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 No    Physical/Health Conditions which are associated with substance use: NA    Are Health Concerns/Conditions being treated? No  By Whom? NA    Patient Self-Reported Medications:  Medication Dosage Frequency   Advil PRN  800mg  2-3x per week      Are you pregnant: NA OB care received:NA CPS call needed: No    Dimension II Risk Ratin  Reason Risk Rating Assigned: The patient reports he is able to manage and tolerate back pain related to work. The patient reports no primary care provider at this time. The patient does have insurance ([employer] [place]) so he does have access to medical care should he need. At this time there are no emergent biomedical concerns.     Dimension III Emotional/Behavioral/Cognitive:    Oriented to person, place, time, situation?  Yes     Current Mental Health Services: No current services however going for an eval at [facility] in [place] next Thursday     Past Hospitalization for MH or psychiatric problems: No    How many Hospitalizations: 0   Last  Hospitalization; date and location: NA      Past or Current Issues with Gambling (Explain): no    Prior Treatment for Gambling: No     MH Diagnoses:    Depression-awhile ago during 2nd divorce   Psychiatrist:  [facility]    Clinic:  [facility]    Current Psychotropic Medications:  None    Taking medications as prescribed:  [facility]   Medications Helpful: [facility]    Current Suicidal Ideation: No  If yes, any plan? NA What is plan?:     Previous Suicide Attempts?  No   Explain:      Current Homicidal Ideation: No  If yes, any plan? NA  What is plan?:     Previous Homicide Attempts? No Explain:     Suicidal/Homicidal Ideation in last 30 days? No  Explain:      Hazardous behavior engaged in which placed self or others in danger (i.e., operating a motor vehicle, unsafe sex, sharing needles, etc.)?   Sharing pipes with other people otherwise none.     Family history of substance and/or mental health diagnosis/issues?  No  Explain:      History of abuse (Physical, Emotional, Sexual)? No  Explain:        Dimension III Risk Ratin  Reason Risk Rating Assigned: The patient reports a past diagnoses of depression during his 2nd divorce years ago. The patient reports that he has a diagnostic assessment at [facility] in [place] on 5/10/18. The patient scored high risk on the PANSI however reported no thoughts of harming himself or others. The patient struggles with impulse control as evidence by his continued use despite significant negative consequences.     Dimension IV Readiness to Change:    Mandated, or coerced into assessment or treatment:  Yes- required by probation and if I dont do it I will go to [facility]    Does client feel there is a problem:   Yes    Verbalization of need/desire to change:   Yes     Willing to follow treatment recommendations: Yes     Impression of : (Check all that apply):    Motivated and cooperative    Are there any spiritual, cultural, or other special needs to be addressed for client to be successful  in treatment? no        Dimension IV Risk Ratin  Reason Risk Rating Assigned: The patient verbalizes a need to initiate change. He reports that he is required to be here as it is required by probation. The patient reports that he is willing to follow recommendations made by treatment staff.     Dimension V Relapse/Continued Use/Continued Problem Potential     Client age at First Treatment: 38    Lifetime # of CD Treatments:  2  List program, dates, and status of completion (within last five years): [facility]-Complete, [facility]-Complete    Longest Period of Abstinence: 1 year and 10 months  How did you accomplish this? Going to meetings, good people, places and things. Taking my recovery serious      Circumstances which led to Relapse: It just happened, I was in the wrong place at the wrong time     Risk Taking/Problem Behaviors Related to Use and/or Under the Influence: None identified      Dimension V Risk Rating: 3  Reason Risk Rating Assigned: At this time the patient has minimal coping skills to arrest use long term. The patient has little recognition of relapse and recidivism issues as evidence by his continued use while on probation and involvement of CPS. The patient is at high vulnerability for continued use.     Dimension VI Recovery Environment   Family support:  No  Peer Sober Support:  Yes    Current living circumstances:  Live with lamine, was living with 2 children however CPS took them on Good Friday.   Children are 11 years and 16 months     Environment supportive of recovery:  Yes    Specific activities participating in which do not involve substance use:  Everything     Specific activities participating in which do involve substance use:  None- I started using more when children got taken away.     People, things that threaten recovery: no    Expected family involvement during treatment services:  None     Current Legal Involvement:  [place]- 1st degree drug possession ; 30 years probation    [place]- Domestic Abuse dropped however violation of DANCO guilty     Legal Consequences related to use:     Occupational/Academic consequences related to use:     Current ability to function in a work and/or education setting: Yes patient works full time    Current support network for recovery (including community-based recovery support): Interested in going to meetings, has gone in the past    Do you belong to a Sun'aq: No Which Sun[employer]aq? NA  Reside on reservation: No     Dimension VI Risk Rating: 3 Reason Risk Rating Assigned: The patient is currently on [facility] probation under [facility] for a 1st degree possession. The patient had recent CPS involvement as his two children were taken away on 3/30/18. 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 The patient has peer support and support from his fiance. The patient is not attending any sober support meetings at this time however is interested in attending as he has gone in the past.           DSM-V Criteria for Substance Abuse  Instructions:  Determine whether the client currently meets the criteria for a Substance Use Disorder using the diagnostic criteria in the  DSM-V, pp. 481-589. Current means during the most recent 12 months outside a facility that controls access to substances.    Category of substance Severity ICD-10 Code/DSM V Code  Alcohol Use Disorder Mild  Moderate  Severe (F10.10) (305.00)  (F10.20) (303.90)  (F10.20) (303.90)   Cannabis Use Disorder Mild  Moderate  Severe (F12.10) (305.20)  (F12.20) (304.30)  (F12.20) (304.30)   Hallucinogen Use Disorder Mild  Moderate  Severe (F16.10) (305.30)  (F16.20) (304.50)  (F16.20) (304.50)   Inhalant Use Disorder Mild  Moderate  Severe (F18.10) (305.90)  (F18.20) (304.60)  (F18.20) (304.60)   Opioid Use Disorder Mild  Moderate  Severe (F11.10) (305.50)  (F11.20) (304.00)  (F11.20) (304.00)   Sedative, Hypnotic, or Anxiolytic Use Disorder Mild  Moderate  Severe (F13.10)  (305.40)  (F13.20) (304.10)  (F13.20) (304.10)   Stimulant Related Disorders Mild              Moderate              Severe   (F15.10) (305.70) Amphetamine type substance  (F14.10) (305.60) Cocaine  (F15.10) (305.70) Other or unspecified stimulant    (F15.20) (304.40) Amphetamine type substance  (F14.20) (304.20) Cocaine  (F15.20) (304.40) Other or unspecified stimulant    (F15.20) (304.40) Amphetamine type substance  (F14.20) (304.20) Cocaine  (F15.20) (304.40) Other or unspecified stimulant   DisorderTobacco use Disorder Mild  Moderate  Severe (Z72.0) (305.1)  (F17.200) (305.1)  (F17.200) (305.1)   Other (or unknown) Substance Use Disorder Mild  Moderate  Severe (F19.10) (305.90)  (F19.20) (304.90)  (F19.20) (304.90)     Diagnostic Impression: Stimulant Use Disorder Amphetamine Type, Severe    Assessment Completed Within 3 Sessions of Admission: Yes  If NO, date assessment to be completed noted in Treatment Plan: NA      Signature of Counselor: DOROTHY Farmer  Date and Time of Signature: 5/2/2018 , 2:37 PM

## 2021-09-11 ENCOUNTER — HEALTH MAINTENANCE LETTER (OUTPATIENT)
Age: 51
End: 2021-09-11

## 2022-10-30 ENCOUNTER — HEALTH MAINTENANCE LETTER (OUTPATIENT)
Age: 52
End: 2022-10-30

## 2023-07-06 ENCOUNTER — HOSPITAL ENCOUNTER (EMERGENCY)
Facility: HOSPITAL | Age: 53
Discharge: HOME | End: 2023-07-06
Attending: EMERGENCY MEDICINE | Admitting: EMERGENCY MEDICINE

## 2023-07-06 VITALS
HEART RATE: 91 BPM | SYSTOLIC BLOOD PRESSURE: 137 MMHG | HEIGHT: 72 IN | WEIGHT: 235 LBS | BODY MASS INDEX: 31.83 KG/M2 | OXYGEN SATURATION: 99 % | RESPIRATION RATE: 20 BRPM | DIASTOLIC BLOOD PRESSURE: 93 MMHG | TEMPERATURE: 98.1 F

## 2023-07-06 DIAGNOSIS — K08.89 PAIN, DENTAL: ICD-10-CM

## 2023-07-06 PROCEDURE — 99284 EMERGENCY DEPT VISIT MOD MDM: CPT

## 2023-07-06 RX ORDER — PENICILLIN V POTASSIUM 500 MG/1
500 TABLET, FILM COATED ORAL 4 TIMES DAILY
Qty: 28 TABLET | Refills: 0 | Status: SHIPPED | OUTPATIENT
Start: 2023-07-06 | End: 2023-07-13

## 2023-07-06 RX ORDER — HYDROCODONE BITARTRATE AND ACETAMINOPHEN 5; 325 MG/1; MG/1
1 TABLET ORAL EVERY 6 HOURS PRN
Qty: 3 TABLET | Refills: 0 | Status: SHIPPED | OUTPATIENT
Start: 2023-07-06

## 2023-07-06 ASSESSMENT — ACTIVITIES OF DAILY LIVING (ADL): ADLS_ACUITY_SCORE: 35

## 2023-07-06 NOTE — ED TRIAGE NOTES
Pt arrives for evaluation of tooth pain to his right lower molar. Pt states about a month ago his filling fell out it wasn't bothering him until this past Tuesday. Pt took Advil around 0100.      Triage Assessment     Row Name 07/06/23 0216       Triage Assessment (Adult)    Airway WDL WDL       Respiratory WDL    Respiratory WDL WDL       Skin Circulation/Temperature WDL    Skin Circulation/Temperature WDL WDL       Cardiac WDL    Cardiac WDL WDL       Peripheral/Neurovascular WDL    Peripheral Neurovascular WDL WDL       Cognitive/Neuro/Behavioral WDL    Cognitive/Neuro/Behavioral WDL WDL

## 2023-07-06 NOTE — ED PROVIDER NOTES
EMERGENCY DEPARTMENT ENCOUNTER      NAME: Padilla Tijerina  AGE: 52 year old male  YOB: 1970  MRN: [id number]  EVALUATION DATE & TIME: 7/6/2023  2:20 AM    PCP: No Ref-Primary, Physician    ED PROVIDER: Regina Smith MD      Chief Complaint   Patient presents with     Dental Pain         FINAL IMPRESSION:  1. Pain, dental          ED COURSE & MEDICAL DECISION MAKING:    Pertinent Labs & Imaging studies reviewed. (See chart for details)    2:45 AM I introduced myself to the patient, obtained patient history, performed a physical exam, and discussed plan for ED workup including potential diagnostic laboratory/imaging studies and interventions.    52 year old male presents to the Emergency Department for evaluation of dental pain.  He had a filling fall out of his lower molar and has now began having pain in this area.  On exam he has an obviously fractured tooth which could be the source of his pain.  No obvious sign of periapical abscess.  He has no sublingual swelling and no facial swelling or erythema and no sign of deeper abscess at this time.  No sign of Sarah's angina.  He is tolerating his secretions without difficulty and has no respiratory distress.  He was offered a dental block which she declined.  He drove himself here to the emergency department so we discussed we cannot give him opioid pain medication here.  States he would like something that would help him to sleep and he has plans to follow-up with a dentist.  He was given low-cost dental resources information as well.  He was given a prescription for 3 tablets of Norco and also for empiric penicillin for 7 days.  He was in agreement with this plan.  He was given indications for return emergency department.  He voiced understanding.  He was discharged home in stable condition.         At the conclusion of the encounter I discussed the results of all of the tests and the disposition. The questions were answered. The patient or family  "acknowledged understanding and was agreeable with the care plan.       Medical Decision Making    History:    Supplemental history from: Documented in chart, if applicable    External Record(s) reviewed: Documented in chart, if applicable.    Work Up:    Chart documentation includes differential considered and any EKGs or imaging independently interpreted by provider, where specified.    In additional to work up documented, I considered the following work up: Documented in chart, if applicable.    External consultation:    Discussion of management with another provider: Documented in chart, if applicable    Complicating factors:    Care impacted by chronic illness: N/A    Care affected by social determinants of health: Access to Medical Care    Disposition considerations: Discharge. I prescribed additional prescription strength medication(s) as charted. See documentation for any additional details.          MEDICATIONS GIVEN IN THE EMERGENCY:  Medications - No data to display    NEW PRESCRIPTIONS STARTED AT TODAY'S ER VISIT  Discharge Medication List as of 7/6/2023  2:59 AM      START taking these medications    Details   HYDROcodone-acetaminophen (NORCO) 5-325 MG tablet Take 1 tablet by mouth every 6 hours as needed for severe pain, Disp-3 tablet, R-0, Local Print      penicillin V (VEETID) 500 MG tablet Take 1 tablet (500 mg) by mouth 4 times daily for 7 days, Disp-28 tablet, R-0, Local Print                =================================================================    HPI    Patient information was obtained from: Patient     Use of : N/A       Padilla Tijerina is a 52 year old male with a pertinent history of smoking who presents to this ED for evaluation of dental pain    The patient reports he lost a filling in his right lower jaw about a month ago, but never follow up with his dentist. The patient reports he started to develop a \"toothache\" two days ago, but \"wasn't that bad\" until tonight around " "10 PM when it became \"really bad\" and Advil \"hasn't helped\". The patient denies facial swelling, facial redness, fever, shortness of breath, or trouble swallowing.     The patient denies medical problems or daily medication use    Social history: The patient reports smoking    REVIEW OF SYSTEMS   Pertinent positives and negatives are documented in the HPI. All other systems reviewed and are negative.      PAST MEDICAL HISTORY:  No past medical history on file.    PAST SURGICAL HISTORY:  No past surgical history on file.        CURRENT MEDICATIONS:    HYDROcodone-acetaminophen (NORCO) 5-325 MG tablet  albuterol (PROAIR HFA;PROVENTIL HFA;VENTOLIN HFA) 90 mcg/actuation inhaler  benzonatate (TESSALON) 100 MG capsule  loperamide (IMODIUM) 2 mg capsule        ALLERGIES:  No Known Allergies    FAMILY HISTORY:  No family history on file.    SOCIAL HISTORY:   Social History     Socioeconomic History     Marital status: Single   Tobacco Use     Smoking status: Every Day       VITALS:  BP (!) 137/93   Pulse 91   Temp 98.1  F (36.7  C) (Oral)   Resp 20   Ht 1.829 m (6')   Wt 106.6 kg (235 lb)   SpO2 99%   BMI 31.87 kg/m      PHYSICAL EXAM    Physical Exam  Constitutional: Well developed, Well nourished, NAD, GCS 15  HENT: Normocephalic, Atraumatic, Bilateral external ears normal, Oropharynx normal, poor dentition, fractured right lower molar, no apical abscess, mucous membranes moist, Nose normal. Neck- Normal range of motion, No tenderness, Supple, No stridor.   Eyes: PERRL, EOMI, Conjunctiva normal, No discharge.   Respiratory: Normal breath sounds, No respiratory distress, No wheezing or crackles, Speaks in full sentences easily.   Cardiovascular: Normal heart rate, Regular rhythm, No murmurs, No rubs, No gallops. 2+ radial pulses bilaterally  GI: Bowel sounds normal, Soft, No tenderness  Musculoskeletal: 2+ DP pulses. No notable lower extremity edema. Good range of motion in all major joints.  Integument: Warm, Dry, " No erythema, No rash. No petechiae.   Neurologic: Alert & oriented x 3, 5/5 strength in all 4 extremities bilaterally. Sensation intact to light touch in all 4 extremities and the face bilaterally. No focal deficits noted. Normal gait.    Psychiatric: Affect normal, Judgment normal, Mood normal. Cooperative.      LAB:  All pertinent labs reviewed and interpreted.       RADIOLOGY:  Reviewed all pertinent imaging. Please see official radiology report.  No orders to display       [employer] [facility] Documentation:   CMS Diagnoses:               I, Cherie Kim, am serving as a scribe to document services personally performed by Regina Smith MD based on my observation and the provider's statements to me. I, Regina Smith MD, attest that Cherie Kim is acting in a scribe capacity, has observed my performance of the services and has documented them in accordance with my direction.    Regina Smith MD  [facility] EMERGENCY DEPARTMENT  [address] [place] [id number] [phone]     Regina Smith MD  07/22/23 1023

## 2023-07-06 NOTE — DISCHARGE INSTRUCTIONS
Many of these clinics offer a sliding fee option for patients that qualify, and see patients on a walk-in or same day basis. Please call each clinic directly. As services, hours, fees and policies vary greatly.    [place]:  [facility]     [phone]  [facility] ([facility]) [phone]  [facility]  [phone]  [facility]      [phone]   [facility]    [phone]  [facility]  [phone]  [facility] ([facility]) [phone]  [facility]     [phone]  [facility]   [phone]  [facility] (cash only)   [phone]  [facility]    [phone] (adults)          [phone] (children)    [place]:  [facility]     [phone]; [phone]  [facility]     [phone]  [facility]     [phone]  [facility] (free, limited)    [phone]    Multiple Locations:  [facility]       [phone]

## 2023-11-12 ENCOUNTER — HEALTH MAINTENANCE LETTER (OUTPATIENT)
Age: 53
End: 2023-11-12

## 2024-12-28 ENCOUNTER — HEALTH MAINTENANCE LETTER (OUTPATIENT)
Age: 54
End: 2024-12-28